# Patient Record
Sex: MALE | Race: BLACK OR AFRICAN AMERICAN | NOT HISPANIC OR LATINO | URBAN - METROPOLITAN AREA
[De-identification: names, ages, dates, MRNs, and addresses within clinical notes are randomized per-mention and may not be internally consistent; named-entity substitution may affect disease eponyms.]

---

## 2019-03-11 ENCOUNTER — EMERGENCY (EMERGENCY)
Facility: HOSPITAL | Age: 49
LOS: 0 days | Discharge: HOME | End: 2019-03-12
Attending: EMERGENCY MEDICINE
Payer: MEDICAID

## 2019-03-11 VITALS
OXYGEN SATURATION: 98 % | HEART RATE: 81 BPM | SYSTOLIC BLOOD PRESSURE: 119 MMHG | RESPIRATION RATE: 16 BRPM | HEIGHT: 69 IN | TEMPERATURE: 98 F | DIASTOLIC BLOOD PRESSURE: 46 MMHG | WEIGHT: 195.99 LBS

## 2019-03-11 DIAGNOSIS — R73.03 PREDIABETES: ICD-10-CM

## 2019-03-11 DIAGNOSIS — R10.9 UNSPECIFIED ABDOMINAL PAIN: ICD-10-CM

## 2019-03-11 DIAGNOSIS — E78.00 PURE HYPERCHOLESTEROLEMIA, UNSPECIFIED: ICD-10-CM

## 2019-03-11 DIAGNOSIS — I10 ESSENTIAL (PRIMARY) HYPERTENSION: ICD-10-CM

## 2019-03-11 DIAGNOSIS — N12 TUBULO-INTERSTITIAL NEPHRITIS, NOT SPECIFIED AS ACUTE OR CHRONIC: ICD-10-CM

## 2019-03-11 PROCEDURE — 99285 EMERGENCY DEPT VISIT HI MDM: CPT

## 2019-03-11 RX ORDER — SODIUM CHLORIDE 9 MG/ML
1000 INJECTION INTRAMUSCULAR; INTRAVENOUS; SUBCUTANEOUS ONCE
Qty: 0 | Refills: 0 | Status: COMPLETED | OUTPATIENT
Start: 2019-03-11 | End: 2019-03-11

## 2019-03-11 RX ORDER — MORPHINE SULFATE 50 MG/1
4 CAPSULE, EXTENDED RELEASE ORAL ONCE
Qty: 0 | Refills: 0 | Status: DISCONTINUED | OUTPATIENT
Start: 2019-03-11 | End: 2019-03-11

## 2019-03-11 RX ORDER — KETOROLAC TROMETHAMINE 30 MG/ML
15 SYRINGE (ML) INJECTION ONCE
Qty: 0 | Refills: 0 | Status: DISCONTINUED | OUTPATIENT
Start: 2019-03-11 | End: 2019-03-11

## 2019-03-11 NOTE — ED ADULT NURSE NOTE - OBJECTIVE STATEMENT
pt received to bed 15 c/o right sided flank pain, dysuria, hematuria, nausea, and chills that have been "off and on" starting 2 days ago. denies: chest, fever, vomiting, burning on urination. pt states he has HTN, high cholesterol and is not compliant with medication regimen.

## 2019-03-11 NOTE — ED ADULT TRIAGE NOTE - CHIEF COMPLAINT QUOTE
pt presents w right flank pain x 1.5 days. Hx of kidney stones, states it feels like that again w hematuria and dysuria. No foul odor, fever, chills. Pt appears comfortable in triage. took tylenol and motrin for pain without relief

## 2019-03-12 VITALS
DIASTOLIC BLOOD PRESSURE: 84 MMHG | RESPIRATION RATE: 18 BRPM | HEART RATE: 63 BPM | SYSTOLIC BLOOD PRESSURE: 127 MMHG | TEMPERATURE: 98 F | OXYGEN SATURATION: 99 %

## 2019-03-12 LAB
ALBUMIN SERPL ELPH-MCNC: 3.3 G/DL — SIGNIFICANT CHANGE UP (ref 3.3–5)
ALP SERPL-CCNC: 70 U/L — SIGNIFICANT CHANGE UP (ref 40–120)
ALT FLD-CCNC: 15 U/L — SIGNIFICANT CHANGE UP (ref 12–78)
ANION GAP SERPL CALC-SCNC: 6 MMOL/L — SIGNIFICANT CHANGE UP (ref 5–17)
APPEARANCE UR: ABNORMAL
AST SERPL-CCNC: 16 U/L — SIGNIFICANT CHANGE UP (ref 15–37)
BACTERIA # UR AUTO: ABNORMAL
BASOPHILS # BLD AUTO: 0.07 K/UL — SIGNIFICANT CHANGE UP (ref 0–0.2)
BASOPHILS NFR BLD AUTO: 0.7 % — SIGNIFICANT CHANGE UP (ref 0–2)
BILIRUB SERPL-MCNC: 0.3 MG/DL — SIGNIFICANT CHANGE UP (ref 0.2–1.2)
BILIRUB UR-MCNC: NEGATIVE — SIGNIFICANT CHANGE UP
BUN SERPL-MCNC: 12 MG/DL — SIGNIFICANT CHANGE UP (ref 7–23)
CALCIUM SERPL-MCNC: 8.4 MG/DL — LOW (ref 8.5–10.1)
CHLORIDE SERPL-SCNC: 109 MMOL/L — HIGH (ref 96–108)
CO2 SERPL-SCNC: 28 MMOL/L — SIGNIFICANT CHANGE UP (ref 22–31)
COLOR SPEC: YELLOW — SIGNIFICANT CHANGE UP
CREAT SERPL-MCNC: 0.93 MG/DL — SIGNIFICANT CHANGE UP (ref 0.5–1.3)
DIFF PNL FLD: ABNORMAL
EOSINOPHIL # BLD AUTO: 0.38 K/UL — SIGNIFICANT CHANGE UP (ref 0–0.5)
EOSINOPHIL NFR BLD AUTO: 4.1 % — SIGNIFICANT CHANGE UP (ref 0–6)
EPI CELLS # UR: SIGNIFICANT CHANGE UP
GLUCOSE SERPL-MCNC: 94 MG/DL — SIGNIFICANT CHANGE UP (ref 70–99)
GLUCOSE UR QL: NEGATIVE MG/DL — SIGNIFICANT CHANGE UP
HCT VFR BLD CALC: 38.5 % — LOW (ref 39–50)
HGB BLD-MCNC: 13 G/DL — SIGNIFICANT CHANGE UP (ref 13–17)
IMM GRANULOCYTES NFR BLD AUTO: 0.2 % — SIGNIFICANT CHANGE UP (ref 0–1.5)
KETONES UR-MCNC: ABNORMAL
LEUKOCYTE ESTERASE UR-ACNC: ABNORMAL
LYMPHOCYTES # BLD AUTO: 3.7 K/UL — HIGH (ref 1–3.3)
LYMPHOCYTES # BLD AUTO: 39.6 % — SIGNIFICANT CHANGE UP (ref 13–44)
MCHC RBC-ENTMCNC: 30.7 PG — SIGNIFICANT CHANGE UP (ref 27–34)
MCHC RBC-ENTMCNC: 33.8 GM/DL — SIGNIFICANT CHANGE UP (ref 32–36)
MCV RBC AUTO: 91 FL — SIGNIFICANT CHANGE UP (ref 80–100)
MONOCYTES # BLD AUTO: 1.17 K/UL — HIGH (ref 0–0.9)
MONOCYTES NFR BLD AUTO: 12.5 % — SIGNIFICANT CHANGE UP (ref 2–14)
NEUTROPHILS # BLD AUTO: 4 K/UL — SIGNIFICANT CHANGE UP (ref 1.8–7.4)
NEUTROPHILS NFR BLD AUTO: 42.9 % — LOW (ref 43–77)
NITRITE UR-MCNC: NEGATIVE — SIGNIFICANT CHANGE UP
NRBC # BLD: 0 /100 WBCS — SIGNIFICANT CHANGE UP (ref 0–0)
PH UR: 6.5 — SIGNIFICANT CHANGE UP (ref 5–8)
PLATELET # BLD AUTO: 240 K/UL — SIGNIFICANT CHANGE UP (ref 150–400)
POTASSIUM SERPL-MCNC: 3.6 MMOL/L — SIGNIFICANT CHANGE UP (ref 3.5–5.3)
POTASSIUM SERPL-SCNC: 3.6 MMOL/L — SIGNIFICANT CHANGE UP (ref 3.5–5.3)
PROT SERPL-MCNC: 7.1 GM/DL — SIGNIFICANT CHANGE UP (ref 6–8.3)
PROT UR-MCNC: 100 MG/DL
RBC # BLD: 4.23 M/UL — SIGNIFICANT CHANGE UP (ref 4.2–5.8)
RBC # FLD: 16.1 % — HIGH (ref 10.3–14.5)
RBC CASTS # UR COMP ASSIST: ABNORMAL /HPF (ref 0–4)
SODIUM SERPL-SCNC: 143 MMOL/L — SIGNIFICANT CHANGE UP (ref 135–145)
SP GR SPEC: 1.01 — SIGNIFICANT CHANGE UP (ref 1.01–1.02)
UROBILINOGEN FLD QL: 1 MG/DL
WBC # BLD: 9.34 K/UL — SIGNIFICANT CHANGE UP (ref 3.8–10.5)
WBC # FLD AUTO: 9.34 K/UL — SIGNIFICANT CHANGE UP (ref 3.8–10.5)
WBC UR QL: SIGNIFICANT CHANGE UP

## 2019-03-12 PROCEDURE — 76700 US EXAM ABDOM COMPLETE: CPT | Mod: 26

## 2019-03-12 PROCEDURE — 74176 CT ABD & PELVIS W/O CONTRAST: CPT | Mod: 26

## 2019-03-12 RX ORDER — CEFTRIAXONE 500 MG/1
1 INJECTION, POWDER, FOR SOLUTION INTRAMUSCULAR; INTRAVENOUS ONCE
Qty: 0 | Refills: 0 | Status: COMPLETED | OUTPATIENT
Start: 2019-03-12 | End: 2019-03-12

## 2019-03-12 RX ORDER — PHENAZOPYRIDINE HCL 100 MG
1 TABLET ORAL
Qty: 5 | Refills: 0 | OUTPATIENT
Start: 2019-03-12 | End: 2019-03-13

## 2019-03-12 RX ORDER — IBUPROFEN 200 MG
1 TABLET ORAL
Qty: 20 | Refills: 0 | OUTPATIENT
Start: 2019-03-12 | End: 2019-03-16

## 2019-03-12 RX ORDER — PHENAZOPYRIDINE HCL 100 MG
200 TABLET ORAL ONCE
Qty: 0 | Refills: 0 | Status: COMPLETED | OUTPATIENT
Start: 2019-03-12 | End: 2019-03-12

## 2019-03-12 RX ORDER — CEPHALEXIN 500 MG
1 CAPSULE ORAL
Qty: 28 | Refills: 0 | OUTPATIENT
Start: 2019-03-12 | End: 2019-03-25

## 2019-03-12 RX ADMIN — Medication 200 MILLIGRAM(S): at 05:10

## 2019-03-12 RX ADMIN — MORPHINE SULFATE 4 MILLIGRAM(S): 50 CAPSULE, EXTENDED RELEASE ORAL at 00:18

## 2019-03-12 RX ADMIN — CEFTRIAXONE 100 GRAM(S): 500 INJECTION, POWDER, FOR SOLUTION INTRAMUSCULAR; INTRAVENOUS at 03:41

## 2019-03-12 RX ADMIN — Medication 15 MILLIGRAM(S): at 00:18

## 2019-03-12 RX ADMIN — CEFTRIAXONE 1 GRAM(S): 500 INJECTION, POWDER, FOR SOLUTION INTRAMUSCULAR; INTRAVENOUS at 05:20

## 2019-03-12 RX ADMIN — Medication 15 MILLIGRAM(S): at 00:47

## 2019-03-12 RX ADMIN — MORPHINE SULFATE 4 MILLIGRAM(S): 50 CAPSULE, EXTENDED RELEASE ORAL at 00:47

## 2019-03-12 RX ADMIN — SODIUM CHLORIDE 1000 MILLILITER(S): 9 INJECTION INTRAMUSCULAR; INTRAVENOUS; SUBCUTANEOUS at 00:18

## 2019-03-12 RX ADMIN — SODIUM CHLORIDE 1000 MILLILITER(S): 9 INJECTION INTRAMUSCULAR; INTRAVENOUS; SUBCUTANEOUS at 01:31

## 2019-03-12 NOTE — ED PROVIDER NOTE - OBJECTIVE STATEMENT
Pertinent PMH/PSH/FHx/SHx and Review of Systems contained within:  Patient presents to the ED for right flank pain radiating to groin.  Pain started about 1 day ago, accompanied by dysuria, hematuria, and mild nausea.  Denies pain in testicles or fever.  Feels similar to when he had a kidney stone, but says its been a long time since he had one.  +Smoker, denies use of other illict drugs or h/o alchol abuse.  Denies psh, has not followed up with urology in a long time.     ROS: No fever/chills, No headache/photophobia/eye pain/changes in vision, No ear pain/sore throat/dysphagia, No chest pain/palpitations, no SOB/cough/wheeze/stridor, No N/V/D/melena, no discharge, No neck/back pain, no rash, no changes in neurological status/function.

## 2019-03-12 NOTE — ED PROVIDER NOTE - CLINICAL SUMMARY MEDICAL DECISION MAKING FREE TEXT BOX
Patient with dysuria and right flank pain, hematuria.  VSS.  Lab values reviewed, there are no values which require acute intervention.  UA is equivocal for infection, CT without visible renal stone.  Discussed with patient that appendix is not visualized, has no RLQ tenderness on palpation but advised to be vigilant.  More likely has pyelonephritis vs radiolucent stone.  Will treat for pyelo, give urology follow up.  Looks good, not septic, patient actually left the ER for a smoke.  Discussed results and outcome of today's visit with the patient.  Patient advised to please follow up with another healthcare provider within the next 24 hours and return to the Emergency Department for worsening symptoms or any other concerns.  Patient advised that their doctor may call  to follow up on the specific results of the tests performed today in the emergency department.   Patient appears well on discharge.

## 2019-03-12 NOTE — ED PROVIDER NOTE - PHYSICAL EXAMINATION
Gen: Alert, moderate distress, well appearing  Head: NC, AT, PERRL, EOMI, normal lids/conjunctiva  ENT: normal hearing, patent oropharynx without erythema/exudate, uvula midline  Neck: +supple, no tenderness/meningismus/JVD, +Trachea midline  Pulm: Bilateral BS, normal resp effort, no wheeze/stridor/retractions  CV: RRR, no M/R/G, +dist pulses  Abd: soft, +right flank and right sided abdominal pain, large abdominal girth, ND, +BS, no palpable masses  Mskel: no edema/erythema/cyanosis  Skin: no rash, warm/dry  Neuro: AAOx3, no apparent sensory/motor deficits, coordination intact

## 2019-03-13 LAB
CULTURE RESULTS: SIGNIFICANT CHANGE UP
SPECIMEN SOURCE: SIGNIFICANT CHANGE UP

## 2019-03-14 ENCOUNTER — EMERGENCY (EMERGENCY)
Facility: HOSPITAL | Age: 49
LOS: 0 days | Discharge: HOME | End: 2019-03-15
Attending: EMERGENCY MEDICINE
Payer: MEDICAID

## 2019-03-14 VITALS
DIASTOLIC BLOOD PRESSURE: 89 MMHG | TEMPERATURE: 98 F | HEIGHT: 69 IN | OXYGEN SATURATION: 99 % | HEART RATE: 75 BPM | RESPIRATION RATE: 16 BRPM | WEIGHT: 195.11 LBS | SYSTOLIC BLOOD PRESSURE: 139 MMHG

## 2019-03-14 DIAGNOSIS — R10.9 UNSPECIFIED ABDOMINAL PAIN: ICD-10-CM

## 2019-03-14 DIAGNOSIS — I10 ESSENTIAL (PRIMARY) HYPERTENSION: ICD-10-CM

## 2019-03-14 DIAGNOSIS — R73.03 PREDIABETES: ICD-10-CM

## 2019-03-14 DIAGNOSIS — N39.0 URINARY TRACT INFECTION, SITE NOT SPECIFIED: ICD-10-CM

## 2019-03-14 DIAGNOSIS — E78.5 HYPERLIPIDEMIA, UNSPECIFIED: ICD-10-CM

## 2019-03-14 LAB
ALBUMIN SERPL ELPH-MCNC: 3.7 G/DL — SIGNIFICANT CHANGE UP (ref 3.3–5)
ALP SERPL-CCNC: 74 U/L — SIGNIFICANT CHANGE UP (ref 40–120)
ALT FLD-CCNC: 17 U/L — SIGNIFICANT CHANGE UP (ref 12–78)
ANION GAP SERPL CALC-SCNC: 9 MMOL/L — SIGNIFICANT CHANGE UP (ref 5–17)
APPEARANCE UR: CLEAR — SIGNIFICANT CHANGE UP
APTT BLD: 32.2 SEC — SIGNIFICANT CHANGE UP (ref 28.5–37)
AST SERPL-CCNC: 18 U/L — SIGNIFICANT CHANGE UP (ref 15–37)
BACTERIA # UR AUTO: NEGATIVE — SIGNIFICANT CHANGE UP
BASOPHILS # BLD AUTO: 0.03 K/UL — SIGNIFICANT CHANGE UP (ref 0–0.2)
BASOPHILS NFR BLD AUTO: 0.3 % — SIGNIFICANT CHANGE UP (ref 0–2)
BILIRUB SERPL-MCNC: 0.6 MG/DL — SIGNIFICANT CHANGE UP (ref 0.2–1.2)
BILIRUB UR-MCNC: ABNORMAL
BUN SERPL-MCNC: 8 MG/DL — SIGNIFICANT CHANGE UP (ref 7–23)
CALCIUM SERPL-MCNC: 8.2 MG/DL — LOW (ref 8.5–10.1)
CHLORIDE SERPL-SCNC: 106 MMOL/L — SIGNIFICANT CHANGE UP (ref 96–108)
CO2 SERPL-SCNC: 23 MMOL/L — SIGNIFICANT CHANGE UP (ref 22–31)
COLOR SPEC: ABNORMAL
CREAT SERPL-MCNC: 1 MG/DL — SIGNIFICANT CHANGE UP (ref 0.5–1.3)
DIFF PNL FLD: NEGATIVE — SIGNIFICANT CHANGE UP
EOSINOPHIL # BLD AUTO: 0.03 K/UL — SIGNIFICANT CHANGE UP (ref 0–0.5)
EOSINOPHIL NFR BLD AUTO: 0.3 % — SIGNIFICANT CHANGE UP (ref 0–6)
EPI CELLS # UR: SIGNIFICANT CHANGE UP
GLUCOSE SERPL-MCNC: 81 MG/DL — SIGNIFICANT CHANGE UP (ref 70–99)
GLUCOSE UR QL: NEGATIVE MG/DL — SIGNIFICANT CHANGE UP
HCT VFR BLD CALC: 39.6 % — SIGNIFICANT CHANGE UP (ref 39–50)
HGB BLD-MCNC: 13.5 G/DL — SIGNIFICANT CHANGE UP (ref 13–17)
IMM GRANULOCYTES NFR BLD AUTO: 0.3 % — SIGNIFICANT CHANGE UP (ref 0–1.5)
INR BLD: 1.03 RATIO — SIGNIFICANT CHANGE UP (ref 0.88–1.16)
KETONES UR-MCNC: ABNORMAL
LACTATE SERPL-SCNC: 1.1 MMOL/L — SIGNIFICANT CHANGE UP (ref 0.7–2)
LEUKOCYTE ESTERASE UR-ACNC: NEGATIVE — SIGNIFICANT CHANGE UP
LIDOCAIN IGE QN: 77 U/L — SIGNIFICANT CHANGE UP (ref 73–393)
LYMPHOCYTES # BLD AUTO: 19.9 % — SIGNIFICANT CHANGE UP (ref 13–44)
LYMPHOCYTES # BLD AUTO: 2.07 K/UL — SIGNIFICANT CHANGE UP (ref 1–3.3)
MCHC RBC-ENTMCNC: 30.7 PG — SIGNIFICANT CHANGE UP (ref 27–34)
MCHC RBC-ENTMCNC: 34.1 GM/DL — SIGNIFICANT CHANGE UP (ref 32–36)
MCV RBC AUTO: 90 FL — SIGNIFICANT CHANGE UP (ref 80–100)
MONOCYTES # BLD AUTO: 0.87 K/UL — SIGNIFICANT CHANGE UP (ref 0–0.9)
MONOCYTES NFR BLD AUTO: 8.4 % — SIGNIFICANT CHANGE UP (ref 2–14)
NEUTROPHILS # BLD AUTO: 7.37 K/UL — SIGNIFICANT CHANGE UP (ref 1.8–7.4)
NEUTROPHILS NFR BLD AUTO: 70.8 % — SIGNIFICANT CHANGE UP (ref 43–77)
NITRITE UR-MCNC: POSITIVE
NRBC # BLD: 0 /100 WBCS — SIGNIFICANT CHANGE UP (ref 0–0)
PH UR: 5 — SIGNIFICANT CHANGE UP (ref 5–8)
PLATELET # BLD AUTO: 250 K/UL — SIGNIFICANT CHANGE UP (ref 150–400)
POTASSIUM SERPL-MCNC: 3.6 MMOL/L — SIGNIFICANT CHANGE UP (ref 3.5–5.3)
POTASSIUM SERPL-SCNC: 3.6 MMOL/L — SIGNIFICANT CHANGE UP (ref 3.5–5.3)
PROT SERPL-MCNC: 7.8 GM/DL — SIGNIFICANT CHANGE UP (ref 6–8.3)
PROT UR-MCNC: NEGATIVE MG/DL — SIGNIFICANT CHANGE UP
PROTHROM AB SERPL-ACNC: 11.6 SEC — SIGNIFICANT CHANGE UP (ref 10–12.9)
RBC # BLD: 4.4 M/UL — SIGNIFICANT CHANGE UP (ref 4.2–5.8)
RBC # FLD: 15.9 % — HIGH (ref 10.3–14.5)
SODIUM SERPL-SCNC: 138 MMOL/L — SIGNIFICANT CHANGE UP (ref 135–145)
SP GR SPEC: 1.01 — SIGNIFICANT CHANGE UP (ref 1.01–1.02)
UROBILINOGEN FLD QL: 4 MG/DL
WBC # BLD: 10.4 K/UL — SIGNIFICANT CHANGE UP (ref 3.8–10.5)
WBC # FLD AUTO: 10.4 K/UL — SIGNIFICANT CHANGE UP (ref 3.8–10.5)

## 2019-03-14 PROCEDURE — 99285 EMERGENCY DEPT VISIT HI MDM: CPT

## 2019-03-14 RX ORDER — IOHEXOL 300 MG/ML
30 INJECTION, SOLUTION INTRAVENOUS ONCE
Qty: 0 | Refills: 0 | Status: COMPLETED | OUTPATIENT
Start: 2019-03-14 | End: 2019-03-14

## 2019-03-14 RX ORDER — ONDANSETRON 8 MG/1
4 TABLET, FILM COATED ORAL ONCE
Qty: 0 | Refills: 0 | Status: COMPLETED | OUTPATIENT
Start: 2019-03-14 | End: 2019-03-14

## 2019-03-14 RX ORDER — MORPHINE SULFATE 50 MG/1
2 CAPSULE, EXTENDED RELEASE ORAL ONCE
Qty: 0 | Refills: 0 | Status: DISCONTINUED | OUTPATIENT
Start: 2019-03-14 | End: 2019-03-14

## 2019-03-14 RX ORDER — LIDOCAINE 4 G/100G
10 CREAM TOPICAL ONCE
Qty: 0 | Refills: 0 | Status: COMPLETED | OUTPATIENT
Start: 2019-03-14 | End: 2019-03-14

## 2019-03-14 RX ORDER — CIPROFLOXACIN LACTATE 400MG/40ML
400 VIAL (ML) INTRAVENOUS ONCE
Qty: 0 | Refills: 0 | Status: COMPLETED | OUTPATIENT
Start: 2019-03-14 | End: 2019-03-14

## 2019-03-14 RX ORDER — SODIUM CHLORIDE 9 MG/ML
2700 INJECTION INTRAMUSCULAR; INTRAVENOUS; SUBCUTANEOUS ONCE
Qty: 0 | Refills: 0 | Status: COMPLETED | OUTPATIENT
Start: 2019-03-14 | End: 2019-03-14

## 2019-03-14 RX ORDER — CEFTRIAXONE 500 MG/1
1 INJECTION, POWDER, FOR SOLUTION INTRAMUSCULAR; INTRAVENOUS ONCE
Qty: 0 | Refills: 0 | Status: DISCONTINUED | OUTPATIENT
Start: 2019-03-14 | End: 2019-03-14

## 2019-03-14 RX ADMIN — IOHEXOL 30 MILLILITER(S): 300 INJECTION, SOLUTION INTRAVENOUS at 21:20

## 2019-03-14 RX ADMIN — SODIUM CHLORIDE 2700 MILLILITER(S): 9 INJECTION INTRAMUSCULAR; INTRAVENOUS; SUBCUTANEOUS at 21:19

## 2019-03-14 RX ADMIN — ONDANSETRON 4 MILLIGRAM(S): 8 TABLET, FILM COATED ORAL at 21:45

## 2019-03-14 RX ADMIN — MORPHINE SULFATE 2 MILLIGRAM(S): 50 CAPSULE, EXTENDED RELEASE ORAL at 23:50

## 2019-03-14 RX ADMIN — Medication 30 MILLILITER(S): at 21:44

## 2019-03-14 RX ADMIN — Medication 200 MILLIGRAM(S): at 23:49

## 2019-03-14 RX ADMIN — MORPHINE SULFATE 2 MILLIGRAM(S): 50 CAPSULE, EXTENDED RELEASE ORAL at 21:18

## 2019-03-14 RX ADMIN — LIDOCAINE 10 MILLILITER(S): 4 CREAM TOPICAL at 21:44

## 2019-03-14 NOTE — ED ADULT TRIAGE NOTE - CHIEF COMPLAINT QUOTE
patient states I was here two nights ago and it was painful to urinate, now I have pain in my abdomen and lower back and nauseous, and I still have pain when I urinate. took two percocet one hour ago

## 2019-03-14 NOTE — ED ADULT NURSE NOTE - NSIMPLEMENTINTERV_GEN_ALL_ED
Implemented All Universal Safety Interventions:  [place] to call system. Call bell, personal items and telephone within reach. Instruct patient to call for assistance. Room bathroom lighting operational. Non-slip footwear when patient is off stretcher. Physically safe environment: no spills, clutter or unnecessary equipment. Stretcher in lowest position, wheels locked, appropriate side rails in place.

## 2019-03-15 VITALS
HEART RATE: 105 BPM | DIASTOLIC BLOOD PRESSURE: 79 MMHG | OXYGEN SATURATION: 99 % | SYSTOLIC BLOOD PRESSURE: 127 MMHG | TEMPERATURE: 98 F | RESPIRATION RATE: 18 BRPM

## 2019-03-15 PROBLEM — R73.03 PREDIABETES: Chronic | Status: ACTIVE | Noted: 2019-03-11

## 2019-03-15 PROBLEM — I10 ESSENTIAL (PRIMARY) HYPERTENSION: Chronic | Status: ACTIVE | Noted: 2019-03-11

## 2019-03-15 PROBLEM — E78.00 PURE HYPERCHOLESTEROLEMIA, UNSPECIFIED: Chronic | Status: ACTIVE | Noted: 2019-03-11

## 2019-03-15 LAB
CULTURE RESULTS: NO GROWTH — SIGNIFICANT CHANGE UP
SPECIMEN SOURCE: SIGNIFICANT CHANGE UP

## 2019-03-15 PROCEDURE — 74177 CT ABD & PELVIS W/CONTRAST: CPT | Mod: 26

## 2019-03-15 RX ORDER — KETOROLAC TROMETHAMINE 30 MG/ML
15 SYRINGE (ML) INJECTION ONCE
Qty: 0 | Refills: 0 | Status: DISCONTINUED | OUTPATIENT
Start: 2019-03-15 | End: 2019-03-15

## 2019-03-15 RX ADMIN — Medication 15 MILLIGRAM(S): at 02:07

## 2019-03-15 NOTE — ED PROVIDER NOTE - OBJECTIVE STATEMENT
Pertinent PMH/PSH/FHx/SHx and Review of Systems contained within:  50 yo m with htn and hld presents in ED c/o suprapubic pain. Patient was seen in ED 2 days ago for same symptoms, was dx with pyelonephritis and rx abx. No aggravating or relieving factors, No fever/chills, No photophobia/eye pain/changes in vision, No ear pain/sore throat/dysphagia, No chest pain/palpitations, no SOB/cough/wheeze/stridor, No N/V/D, no dysuria/frequency/discharge, No neck/back pain, no rash, no changes in neurological status/function.

## 2019-03-15 NOTE — ED PROVIDER NOTE - CLINICAL SUMMARY MEDICAL DECISION MAKING FREE TEXT BOX
labs, ct, pain mgmt, gi cocktail, iv abx, re-eval  patient in good condition asking to go outside to smoke cigarette. d/c with care instructions. f/up with pmd. Discussed results and outcome of testing with the patient.  Patient advised to please follow up with their primary care doctor within the next 24 hours and return to the Emergency Department for worsening symptoms or any other concerns.  Patient advised that their doctor may call  to follow up on the specific results of the tests performed today in the emergency department.

## 2019-03-20 LAB
CULTURE RESULTS: SIGNIFICANT CHANGE UP
CULTURE RESULTS: SIGNIFICANT CHANGE UP
SPECIMEN SOURCE: SIGNIFICANT CHANGE UP
SPECIMEN SOURCE: SIGNIFICANT CHANGE UP

## 2019-03-24 ENCOUNTER — EMERGENCY (EMERGENCY)
Facility: HOSPITAL | Age: 49
LOS: 0 days | Discharge: HOME | End: 2019-03-25
Attending: STUDENT IN AN ORGANIZED HEALTH CARE EDUCATION/TRAINING PROGRAM
Payer: MEDICAID

## 2019-03-24 VITALS
TEMPERATURE: 98 F | DIASTOLIC BLOOD PRESSURE: 72 MMHG | RESPIRATION RATE: 16 BRPM | HEIGHT: 69 IN | SYSTOLIC BLOOD PRESSURE: 120 MMHG | HEART RATE: 65 BPM | WEIGHT: 195.99 LBS | OXYGEN SATURATION: 98 %

## 2019-03-24 DIAGNOSIS — Z79.1 LONG TERM (CURRENT) USE OF NON-STEROIDAL ANTI-INFLAMMATORIES (NSAID): ICD-10-CM

## 2019-03-24 DIAGNOSIS — I10 ESSENTIAL (PRIMARY) HYPERTENSION: ICD-10-CM

## 2019-03-24 DIAGNOSIS — R10.9 UNSPECIFIED ABDOMINAL PAIN: ICD-10-CM

## 2019-03-24 DIAGNOSIS — E78.00 PURE HYPERCHOLESTEROLEMIA, UNSPECIFIED: ICD-10-CM

## 2019-03-24 DIAGNOSIS — M54.9 DORSALGIA, UNSPECIFIED: ICD-10-CM

## 2019-03-24 DIAGNOSIS — R73.03 PREDIABETES: ICD-10-CM

## 2019-03-24 DIAGNOSIS — R11.2 NAUSEA WITH VOMITING, UNSPECIFIED: ICD-10-CM

## 2019-03-24 LAB
APPEARANCE UR: CLEAR — SIGNIFICANT CHANGE UP
BILIRUB UR-MCNC: NEGATIVE — SIGNIFICANT CHANGE UP
COLOR SPEC: YELLOW — SIGNIFICANT CHANGE UP
DIFF PNL FLD: NEGATIVE — SIGNIFICANT CHANGE UP
GLUCOSE UR QL: NEGATIVE MG/DL — SIGNIFICANT CHANGE UP
KETONES UR-MCNC: NEGATIVE — SIGNIFICANT CHANGE UP
LEUKOCYTE ESTERASE UR-ACNC: NEGATIVE — SIGNIFICANT CHANGE UP
NITRITE UR-MCNC: NEGATIVE — SIGNIFICANT CHANGE UP
PH UR: 6 — SIGNIFICANT CHANGE UP (ref 5–8)
PROT UR-MCNC: NEGATIVE MG/DL — SIGNIFICANT CHANGE UP
SP GR SPEC: 1.02 — SIGNIFICANT CHANGE UP (ref 1.01–1.02)
UROBILINOGEN FLD QL: 1 MG/DL

## 2019-03-24 PROCEDURE — 99283 EMERGENCY DEPT VISIT LOW MDM: CPT

## 2019-03-24 RX ORDER — TRAMADOL HYDROCHLORIDE 50 MG/1
50 TABLET ORAL ONCE
Qty: 0 | Refills: 0 | Status: DISCONTINUED | OUTPATIENT
Start: 2019-03-24 | End: 2019-03-24

## 2019-03-24 NOTE — ED PROVIDER NOTE - CARE PROVIDER_API CALL
Orville Venegas)  Urology  [address] [place] [address]  Phone: [phone]  Fax: [phone]  Follow Up Time:

## 2019-03-24 NOTE — ED ADULT NURSE NOTE - NSIMPLEMENTINTERV_GEN_ALL_ED
Implemented All Universal Safety Interventions:  [place] to call system. Call bell, personal items and telephone within reach. Instruct patient to call for assistance. Room bathroom lighting operational. Non-slip footwear when patient is off stretcher. Physically safe environment: no spills, clutter or unnecessary equipment. Stretcher in lowest position, wheels locked, appropriate side rails in place.

## 2019-03-24 NOTE — ED ADULT TRIAGE NOTE - CHIEF COMPLAINT QUOTE
patient states im nauseous, I have back pain radiating to the abdomen, last urinated this morning. denies hematuria

## 2019-03-24 NOTE — ED PROVIDER NOTE - CLINICAL SUMMARY MEDICAL DECISION MAKING FREE TEXT BOX
pt presented with abdominal pain, pt has been seen for the same symptoms, had ct and sonogram which were negative, pt was treated for a uti, urine now clean, pt refuses to have a repeat ct due to too much radiation exposure, intitally agreed for lab work but then also refused labs, asked what would be given for pain, tramadol given, pt told he has to follow up with urology, outpt referral given for a urology clinic pt presented with abdominal pain, pt has been seen for the same symptoms, had ct and sonogram which were negative, pt was treated for a uti, urine now clean, pt refuses to have a repeat ct due to too much radiation exposure, inittally agreed for lab work but then also refused labs, asked what would be given for pain, tramadol given, pt told he has to follow up with urology, outpt referral given for a urology clinic

## 2019-03-24 NOTE — ED ADULT NURSE NOTE - OBJECTIVE STATEMENT
Pt is c/o persistent pain LLQ and Left flank, painful urination, and nausea.  Pt is currently taking abx treatment.

## 2019-03-24 NOTE — ED PROVIDER NOTE - OBJECTIVE STATEMENT
49 year old male presents today c/o back pain and abdominal pain associated with nausea and vomiting, pt has been seen two other times in the ER for the same symptoms, has had two Ct and sono which showed mild cystitis, pt was treated for pyelo and given percocet for pain, urine cultures showed <10,000 normal mau, blood cultures were negative, pt did not follow up with urology, (-) fevers or chills (-) chest pain (-) sob 49 year old male presents today c/o right back pain and abdominal pain associated with nausea and vomiting, pt has been seen two other times in the ER for the same symptoms, has had two Ct and sono which showed mild cystitis, pt was treated for pyelo and given percocet for pain, urine cultures showed <10,000 normal mau, blood cultures were negative, pt did not follow up with urology, (-) fevers or chills (-) chest pain (-) sob

## 2019-03-25 RX ORDER — TRAMADOL HYDROCHLORIDE 50 MG/1
1 TABLET ORAL
Qty: 12 | Refills: 0 | OUTPATIENT
Start: 2019-03-25 | End: 2019-03-27

## 2019-03-25 RX ADMIN — TRAMADOL HYDROCHLORIDE 50 MILLIGRAM(S): 50 TABLET ORAL at 00:05

## 2019-03-26 LAB
CULTURE RESULTS: NO GROWTH — SIGNIFICANT CHANGE UP
SPECIMEN SOURCE: SIGNIFICANT CHANGE UP

## 2019-03-28 PROBLEM — Z00.00 ENCOUNTER FOR PREVENTIVE HEALTH EXAMINATION: Status: ACTIVE | Noted: 2019-03-28

## 2019-03-29 ENCOUNTER — OUTPATIENT (OUTPATIENT)
Dept: OUTPATIENT SERVICES | Facility: HOSPITAL | Age: 49
LOS: 1 days | End: 2019-03-29
Payer: MEDICAID

## 2019-03-29 ENCOUNTER — RESULT REVIEW (OUTPATIENT)
Age: 49
End: 2019-03-29

## 2019-03-29 ENCOUNTER — APPOINTMENT (OUTPATIENT)
Dept: UROLOGY | Facility: CLINIC | Age: 49
End: 2019-03-29

## 2019-03-29 VITALS
BODY MASS INDEX: 28.88 KG/M2 | HEIGHT: 69 IN | WEIGHT: 195 LBS | SYSTOLIC BLOOD PRESSURE: 146 MMHG | HEART RATE: 71 BPM | RESPIRATION RATE: 16 BRPM | DIASTOLIC BLOOD PRESSURE: 86 MMHG

## 2019-03-29 DIAGNOSIS — R35.0 FREQUENCY OF MICTURITION: ICD-10-CM

## 2019-03-29 DIAGNOSIS — R31.29 OTHER MICROSCOPIC HEMATURIA: ICD-10-CM

## 2019-03-29 DIAGNOSIS — Z86.39 PERSONAL HISTORY OF OTHER ENDOCRINE, NUTRITIONAL AND METABOLIC DISEASE: ICD-10-CM

## 2019-03-29 DIAGNOSIS — R10.9 UNSPECIFIED ABDOMINAL PAIN: ICD-10-CM

## 2019-03-29 DIAGNOSIS — F17.200 NICOTINE DEPENDENCE, UNSPECIFIED, UNCOMPLICATED: ICD-10-CM

## 2019-03-29 PROCEDURE — G0463: CPT | Mod: 25

## 2019-03-29 PROCEDURE — 88112 CYTOPATH CELL ENHANCE TECH: CPT | Mod: 26

## 2019-03-29 PROCEDURE — 88112 CYTOPATH CELL ENHANCE TECH: CPT

## 2019-03-29 PROCEDURE — 51798 US URINE CAPACITY MEASURE: CPT

## 2019-04-01 NOTE — HISTORY OF PRESENT ILLNESS
[Urinary Frequency] : urinary frequency [Nocturia] : nocturia [Intermittency] : intermittency [Dysuria] : dysuria [Abdominal Pain] : abdominal pain [Nausea] : nausea [FreeTextEntry1] : Mr Persaud is a 50 yo male with hx heart disease and DM presents with abdominal pain and LUTS. The pain has been for the last 2 weeks. It is located in the epigastrium/left midabdomen and is worsened by urination and movement. It is associated with new-onset urinary frequency, nocturia and intermittency. When it started, he noticed blood in his urine which prompted him to go to the ED. Urine culture at that time was negative and CT abdomen showed a thickened bladder wall. No stones or other intraabdominal pathology was noted. His denies any further gross hematuria. One week later the pain continued and he went to the ED. He still did not have a urinary tract infection and CT findings were the same. \par \par Since 2 days ago, he started having nonbloody diarrhea and nausea. He has occasional chills but no fevers.  [Urinary Incontinence] : no urinary incontinence [Urinary Retention] : no urinary retention [Urinary Urgency] : no urinary urgency [Straining] : no straining [Hematuria - Gross] : no gross hematuria [Hematuria - Microscopic] : no microscopic hematuria [Fever] : no fever

## 2019-04-01 NOTE — REVIEW OF SYSTEMS
[Chills] : chills [Feeling Tired] : feeling tired [Eyesight Problems] : eyesight problems [Earache] : earache [Sore Throat] : sore throat [Chest Pain] : chest pain [Shortness Of Breath] : shortness of breath [Cough] : cough [Wheezing] : wheezing [Abdominal Pain] : abdominal pain [Vomiting] : vomiting [Constipation] : constipation [Diarrhea] : diarrhea [Heartburn] : heartburn [Dysuria] : dysuria [Nocturia] : nocturia [Joint Pain] : joint pain [Skin Wound] : skin wound [Itching] : itching [Dizziness] : dizziness [Limb Weakness] : limb weakness [Anxiety] : anxiety [Muscle Weakness] : muscle weakness [Feelings Of Weakness] : feelings of weakness [Fever] : no fever [Dry Eyes] : no dryness of the eyes [Palpitations] : no palpitations [Joint Swelling] : no joint swelling [Limb Swelling] : no limb swelling [Skin Lesions] : no skin lesions [Difficulty Walking] : no difficulty walking [Depression] : no depression [Hot Flashes] : no hot flashes

## 2019-04-01 NOTE — ASSESSMENT
[FreeTextEntry1] : 49M smoker (40 pack years) with hx DM and HLD presenting with abdominal pain and LUTS x 2 weeks and 1 day of gross hematuria. Repeat UA negative for blood. Urine culture negative x2. CT scan shows bladder wall thickening and a right inguinal hernia but no stone disease or other intraabdominal pathology\par \par - Due to the urinary symptoms and hematuria, patient will need a workup to evaluate for causes of hematuria. This is especially important considering his significant smoking history and findings of bladder wall thickening on CT. \par - Urine cytology sent\par - Followup for cystoscopy in office\par - The location of the patient's pain as well as diarrhea makes the etiology of the pain unlikely to be urologic. Patient was advised to followup with a primary care physician for further evaluation\par

## 2019-04-01 NOTE — PHYSICAL EXAM
[General Appearance - Well Developed] : well developed [General Appearance - Well Nourished] : well nourished [Normal Appearance] : normal appearance [] : no respiratory distress [Exaggerated Use Of Accessory Muscles For Inspiration] : no accessory muscle use [Chest Palpation] : palpation of the chest revealed no abnormalities [Abdomen Soft] : soft [Urethral Meatus] : meatus normal [Penis Abnormality] : normal uncircumcised penis [Scrotum] : the scrotum was normal [Epididymis] : the epididymides were normal [Testes Tenderness] : no tenderness of the testes [Normal Station and Gait] : the gait and station were normal for the patient's age [Oriented To Time, Place, And Person] : oriented to person, place, and time [Cervical Lymph Nodes Enlarged Posterior Bilaterally] : posterior cervical [Supraclavicular Lymph Nodes Enlarged Bilaterally] : supraclavicular [Inguinal Lymph Nodes Enlarged Bilaterally] : inguinal [FreeTextEntry1] : PVR = 0

## 2019-04-05 DIAGNOSIS — R93.89 ABNORMAL FINDINGS ON DIAGNOSTIC IMAGING OF OTHER SPECIFIED BODY STRUCTURES: ICD-10-CM

## 2019-04-05 DIAGNOSIS — R31.0 GROSS HEMATURIA: ICD-10-CM

## 2019-04-05 DIAGNOSIS — R31.29 OTHER MICROSCOPIC HEMATURIA: ICD-10-CM

## 2019-04-26 ENCOUNTER — APPOINTMENT (OUTPATIENT)
Dept: UROLOGY | Facility: CLINIC | Age: 49
End: 2019-04-26

## 2019-04-26 ENCOUNTER — OUTPATIENT (OUTPATIENT)
Dept: OUTPATIENT SERVICES | Facility: HOSPITAL | Age: 49
LOS: 1 days | End: 2019-04-26
Payer: MEDICAID

## 2019-04-26 DIAGNOSIS — R93.89 ABNORMAL FINDINGS ON DIAGNOSTIC IMAGING OF OTHER SPECIFIED BODY STRUCTURES: ICD-10-CM

## 2019-04-26 DIAGNOSIS — R35.0 FREQUENCY OF MICTURITION: ICD-10-CM

## 2019-04-26 PROCEDURE — 52000 CYSTOURETHROSCOPY: CPT

## 2019-04-26 PROCEDURE — G0463: CPT | Mod: 25

## 2019-05-09 DIAGNOSIS — R31.0 GROSS HEMATURIA: ICD-10-CM

## 2019-05-09 DIAGNOSIS — R93.89 ABNORMAL FINDINGS ON DIAGNOSTIC IMAGING OF OTHER SPECIFIED BODY STRUCTURES: ICD-10-CM

## 2019-05-09 DIAGNOSIS — N32.89 OTHER SPECIFIED DISORDERS OF BLADDER: ICD-10-CM

## 2019-08-22 ENCOUNTER — APPOINTMENT (OUTPATIENT)
Dept: GASTROENTEROLOGY | Facility: CLINIC | Age: 49
End: 2019-08-22
Payer: MEDICAID

## 2019-08-22 VITALS
RESPIRATION RATE: 16 BRPM | OXYGEN SATURATION: 96 % | DIASTOLIC BLOOD PRESSURE: 68 MMHG | HEART RATE: 67 BPM | BODY MASS INDEX: 23.25 KG/M2 | WEIGHT: 157 LBS | SYSTOLIC BLOOD PRESSURE: 110 MMHG | HEIGHT: 69 IN

## 2019-08-22 DIAGNOSIS — Z78.9 OTHER SPECIFIED HEALTH STATUS: ICD-10-CM

## 2019-08-22 DIAGNOSIS — R63.4 ABNORMAL WEIGHT LOSS: ICD-10-CM

## 2019-08-22 DIAGNOSIS — R10.13 EPIGASTRIC PAIN: ICD-10-CM

## 2019-08-22 DIAGNOSIS — R11.2 NAUSEA WITH VOMITING, UNSPECIFIED: ICD-10-CM

## 2019-08-22 DIAGNOSIS — Z83.3 FAMILY HISTORY OF DIABETES MELLITUS: ICD-10-CM

## 2019-08-22 DIAGNOSIS — R68.81 EARLY SATIETY: ICD-10-CM

## 2019-08-22 PROCEDURE — 36415 COLL VENOUS BLD VENIPUNCTURE: CPT

## 2019-08-22 PROCEDURE — 99204 OFFICE O/P NEW MOD 45 MIN: CPT | Mod: 25

## 2019-08-22 RX ORDER — KETOROLAC TROMETHAMINE 10 MG/1
10 TABLET, FILM COATED ORAL EVERY 6 HOURS
Qty: 12 | Refills: 0 | Status: DISCONTINUED | COMMUNITY
Start: 2019-03-29 | End: 2019-08-22

## 2019-08-23 LAB
ALBUMIN SERPL ELPH-MCNC: 4.3 G/DL
ALP BLD-CCNC: 66 U/L
ALT SERPL-CCNC: 6 U/L
AMYLASE/CREAT SERPL: 63 U/L
ANION GAP SERPL CALC-SCNC: 13 MMOL/L
AST SERPL-CCNC: 13 U/L
BASOPHILS # BLD AUTO: 0.06 K/UL
BASOPHILS NFR BLD AUTO: 0.8 %
BILIRUB SERPL-MCNC: 0.6 MG/DL
BUN SERPL-MCNC: 11 MG/DL
CALCIUM SERPL-MCNC: 9.5 MG/DL
CHLORIDE SERPL-SCNC: 105 MMOL/L
CO2 SERPL-SCNC: 25 MMOL/L
CREAT SERPL-MCNC: 1.02 MG/DL
EOSINOPHIL # BLD AUTO: 0.18 K/UL
EOSINOPHIL NFR BLD AUTO: 2.3 %
GLUCOSE SERPL-MCNC: 124 MG/DL
HCT VFR BLD CALC: 45.4 %
HGB BLD-MCNC: 15.2 G/DL
IMM GRANULOCYTES NFR BLD AUTO: 0.3 %
LYMPHOCYTES # BLD AUTO: 2.68 K/UL
LYMPHOCYTES NFR BLD AUTO: 34 %
MAN DIFF?: NORMAL
MCHC RBC-ENTMCNC: 32.3 PG
MCHC RBC-ENTMCNC: 33.5 GM/DL
MCV RBC AUTO: 96.4 FL
MONOCYTES # BLD AUTO: 0.81 K/UL
MONOCYTES NFR BLD AUTO: 10.3 %
NEUTROPHILS # BLD AUTO: 4.14 K/UL
NEUTROPHILS NFR BLD AUTO: 52.3 %
PLATELET # BLD AUTO: 234 K/UL
POTASSIUM SERPL-SCNC: 4.1 MMOL/L
PROT SERPL-MCNC: 7.1 G/DL
RBC # BLD: 4.71 M/UL
RBC # FLD: 16.8 %
SODIUM SERPL-SCNC: 143 MMOL/L
TSH SERPL-ACNC: 0.56 UIU/ML
WBC # FLD AUTO: 7.89 K/UL

## 2019-09-12 NOTE — ASSESSMENT
[FreeTextEntry1] : Assessment\par 1) early satiety, nausea, vomiting associated with weight loss in a cigarette smoker is all suggestive of a gastric cancer, rule out gastric ulcer, gastroparesis diabeticorum, lymphoma;  pancreatic cancer would be less likely in view of negative CAT scan in the past year; occult hyperthyroidism is also possible\par 2) average risk for colon cancer with negative colonoscopy in 2011, next screening colonoscopy would be in 2021, \par Plan\par 1) Upper Endoscopy risks, benefits and preparation discussed with the patient \par 2) CBC, CMP, amylase, TSH\par 3) office follow-up

## 2019-09-12 NOTE — PHYSICAL EXAM
[General Appearance - Alert] : alert [General Appearance - In No Acute Distress] : in no acute distress [Sclera] : the sclera and conjunctiva were normal [Examination Of The Oral Cavity] : the lips and gums were normal [PERRL With Normal Accommodation] : pupils were equal in size, round, and reactive to light [Oropharynx] : the oropharynx was normal [Neck Appearance] : the appearance of the neck was normal [Neck Cervical Mass (___cm)] : no neck mass was observed [Jugular Venous Distention Increased] : there was no jugular-venous distention [Thyroid Nodule] : there were no palpable thyroid nodules [Thyroid Diffuse Enlargement] : the thyroid was not enlarged [Auscultation Breath Sounds / Voice Sounds] : lungs were clear to auscultation bilaterally [Heart Rate And Rhythm] : heart rate was normal and rhythm regular [Heart Sounds] : normal S1 and S2 [Heart Sounds Gallop] : no gallops [Murmurs] : no murmurs [Heart Sounds Pericardial Friction Rub] : no pericardial rub [Arterial Pulses Carotid] : carotid pulses were normal with no bruits [Full Pulse] : the pedal pulses are present [Abdominal Aorta] : the abdominal aorta was normal [Edema] : there was no peripheral edema [Bowel Sounds] : normal bowel sounds [Abdomen Soft] : soft [Abdomen Mass (___ Cm)] : no abdominal mass palpated [Abdomen Hernia] : no hernia was discovered [Epigastric] : in the epigastric area [Normal Sphincter Tone] : normal sphincter tone [Internal Hemorrhoid] : internal hemorrhoids [No Rectal Mass] : no rectal mass [Prostate Enlargement] : the prostate was not enlarged [Cervical Lymph Nodes Enlarged Posterior Bilaterally] : posterior cervical [Cervical Lymph Nodes Enlarged Anterior Bilaterally] : anterior cervical [Supraclavicular Lymph Nodes Enlarged Bilaterally] : supraclavicular [Axillary Lymph Nodes Enlarged Bilaterally] : axillary [Femoral Lymph Nodes Enlarged Bilaterally] : femoral [Inguinal Lymph Nodes Enlarged Bilaterally] : inguinal [No CVA Tenderness] : no ~M costovertebral angle tenderness [No Spinal Tenderness] : no spinal tenderness [Abnormal Walk] : normal gait [Nail Clubbing] : no clubbing  or cyanosis of the fingernails [Motor Tone] : muscle strength and tone were normal [Musculoskeletal - Swelling] : no joint swelling seen [Skin Color & Pigmentation] : normal skin color and pigmentation [Skin Turgor] : normal skin turgor [] : no rash [Deep Tendon Reflexes (DTR)] : deep tendon reflexes were 2+ and symmetric [Sensation] : the sensory exam was normal to light touch and pinprick [No Focal Deficits] : no focal deficits [Oriented To Time, Place, And Person] : oriented to person, place, and time [Impaired Insight] : insight and judgment were intact [Affect] : the affect was normal [FreeTextEntry1] : Mallampati 1 [External Hemorrhoid] : no external hemorrhoids [Occult Blood Positive] : stool was negative for occult blood

## 2019-09-12 NOTE — HISTORY OF PRESENT ILLNESS
[_________] : Performed [unfilled] [de-identified] :  Mr. CHRISTOPHER KNOX 49 year man, is seen for evaluation of dyspepsia manifest by  epigastric pain, nocturnal pain, heartburn, nausea, vomiting and bloating.  The pain is not related to meals  He eats one meal per day.  He has noted 40 lbs. of weight loss over the past 8 months.  His appetite is reduced and he notes early satiety.   The patient denies   nocturnal cough, , a change in bowel habit, dysphagia, weight loss, jaundice, melena or hematochezia.  He has  bowel movements daily.  He had a colonoscopy in 2013 for evaluation of hematochezia and hemorrhoids were found.\par \par Urological work-up for hematuria was negative in April 2019.\par There is no family history of colon cancer, colon polyp, peptic ulcer disease, female genitourinary disease, liver disease, cholelithiasis, pancreatic disease or cancer, inflammatory bowel disease or celiac disease.\par   [de-identified] : hemorrhoids

## 2019-09-29 ENCOUNTER — EMERGENCY (EMERGENCY)
Facility: HOSPITAL | Age: 49
LOS: 1 days | Discharge: HOME | End: 2019-09-29
Attending: EMERGENCY MEDICINE | Admitting: EMERGENCY MEDICINE
Payer: MEDICAID

## 2019-09-29 VITALS — WEIGHT: 158.73 LBS

## 2019-09-29 VITALS
SYSTOLIC BLOOD PRESSURE: 121 MMHG | OXYGEN SATURATION: 100 % | TEMPERATURE: 98 F | RESPIRATION RATE: 16 BRPM | HEART RATE: 64 BPM | DIASTOLIC BLOOD PRESSURE: 87 MMHG

## 2019-09-29 LAB
ALBUMIN SERPL ELPH-MCNC: 4.1 G/DL — SIGNIFICANT CHANGE UP (ref 3.3–5)
ALP SERPL-CCNC: 62 U/L — SIGNIFICANT CHANGE UP (ref 40–120)
ALT FLD-CCNC: 6 U/L — SIGNIFICANT CHANGE UP (ref 4–41)
ANION GAP SERPL CALC-SCNC: 13 MMO/L — SIGNIFICANT CHANGE UP (ref 7–14)
APTT BLD: 31.9 SEC — SIGNIFICANT CHANGE UP (ref 27.5–36.3)
AST SERPL-CCNC: 18 U/L — SIGNIFICANT CHANGE UP (ref 4–40)
BASOPHILS # BLD AUTO: 0.05 K/UL — SIGNIFICANT CHANGE UP (ref 0–0.2)
BASOPHILS NFR BLD AUTO: 0.5 % — SIGNIFICANT CHANGE UP (ref 0–2)
BILIRUB SERPL-MCNC: 0.5 MG/DL — SIGNIFICANT CHANGE UP (ref 0.2–1.2)
BUN SERPL-MCNC: 12 MG/DL — SIGNIFICANT CHANGE UP (ref 7–23)
CALCIUM SERPL-MCNC: 9.3 MG/DL — SIGNIFICANT CHANGE UP (ref 8.4–10.5)
CHLORIDE SERPL-SCNC: 102 MMOL/L — SIGNIFICANT CHANGE UP (ref 98–107)
CO2 SERPL-SCNC: 26 MMOL/L — SIGNIFICANT CHANGE UP (ref 22–31)
CREAT SERPL-MCNC: 0.86 MG/DL — SIGNIFICANT CHANGE UP (ref 0.5–1.3)
EOSINOPHIL # BLD AUTO: 0.14 K/UL — SIGNIFICANT CHANGE UP (ref 0–0.5)
EOSINOPHIL NFR BLD AUTO: 1.4 % — SIGNIFICANT CHANGE UP (ref 0–6)
GLUCOSE SERPL-MCNC: 126 MG/DL — HIGH (ref 70–99)
HCT VFR BLD CALC: 40.5 % — SIGNIFICANT CHANGE UP (ref 39–50)
HGB BLD-MCNC: 14.2 G/DL — SIGNIFICANT CHANGE UP (ref 13–17)
IMM GRANULOCYTES NFR BLD AUTO: 0.3 % — SIGNIFICANT CHANGE UP (ref 0–1.5)
INR BLD: 0.87 — LOW (ref 0.88–1.17)
LYMPHOCYTES # BLD AUTO: 3.58 K/UL — HIGH (ref 1–3.3)
LYMPHOCYTES # BLD AUTO: 37 % — SIGNIFICANT CHANGE UP (ref 13–44)
MCHC RBC-ENTMCNC: 32.6 PG — SIGNIFICANT CHANGE UP (ref 27–34)
MCHC RBC-ENTMCNC: 35.1 % — SIGNIFICANT CHANGE UP (ref 32–36)
MCV RBC AUTO: 92.9 FL — SIGNIFICANT CHANGE UP (ref 80–100)
MONOCYTES # BLD AUTO: 1.26 K/UL — HIGH (ref 0–0.9)
MONOCYTES NFR BLD AUTO: 13 % — SIGNIFICANT CHANGE UP (ref 2–14)
NEUTROPHILS # BLD AUTO: 4.61 K/UL — SIGNIFICANT CHANGE UP (ref 1.8–7.4)
NEUTROPHILS NFR BLD AUTO: 47.8 % — SIGNIFICANT CHANGE UP (ref 43–77)
NRBC # FLD: 0 K/UL — SIGNIFICANT CHANGE UP (ref 0–0)
PLATELET # BLD AUTO: 234 K/UL — SIGNIFICANT CHANGE UP (ref 150–400)
PMV BLD: 9.2 FL — SIGNIFICANT CHANGE UP (ref 7–13)
POTASSIUM SERPL-MCNC: 4.2 MMOL/L — SIGNIFICANT CHANGE UP (ref 3.5–5.3)
POTASSIUM SERPL-SCNC: 4.2 MMOL/L — SIGNIFICANT CHANGE UP (ref 3.5–5.3)
PROT SERPL-MCNC: 7.1 G/DL — SIGNIFICANT CHANGE UP (ref 6–8.3)
PROTHROM AB SERPL-ACNC: 9.9 SEC — SIGNIFICANT CHANGE UP (ref 9.8–13.1)
RBC # BLD: 4.36 M/UL — SIGNIFICANT CHANGE UP (ref 4.2–5.8)
RBC # FLD: 14.2 % — SIGNIFICANT CHANGE UP (ref 10.3–14.5)
SODIUM SERPL-SCNC: 141 MMOL/L — SIGNIFICANT CHANGE UP (ref 135–145)
WBC # BLD: 9.67 K/UL — SIGNIFICANT CHANGE UP (ref 3.8–10.5)
WBC # FLD AUTO: 9.67 K/UL — SIGNIFICANT CHANGE UP (ref 3.8–10.5)

## 2019-09-29 PROCEDURE — 99284 EMERGENCY DEPT VISIT MOD MDM: CPT | Mod: 25

## 2019-09-29 PROCEDURE — 70450 CT HEAD/BRAIN W/O DYE: CPT | Mod: 26

## 2019-09-29 PROCEDURE — 93010 ELECTROCARDIOGRAM REPORT: CPT

## 2019-09-29 RX ORDER — KETOROLAC TROMETHAMINE 30 MG/ML
30 SYRINGE (ML) INJECTION ONCE
Refills: 0 | Status: DISCONTINUED | OUTPATIENT
Start: 2019-09-29 | End: 2019-09-29

## 2019-09-29 RX ADMIN — Medication 30 MILLIGRAM(S): at 23:55

## 2019-09-29 NOTE — ED PROVIDER NOTE - OBJECTIVE STATEMENT
Kolby TOLBERT: Patient is a 48 yo M with history of DM, HTN, hyperlipidemia, CAD, hx of CVA x 2 with no residual deficit, hx of nephrolithiasis over 17 yo, current 1 ppd smoker here for acute onset of right flank pain with associated numbness. Patient had a CODE STROKE called by BRIANNE TOLBERT and had a CT Brain that was negative for acute CVA. Neurology evaluated the patient. Patient states he was just sitting on the couch when the right flank pain begain. Denies fevers, chills, nausea, vomiting, No chest pain or sob. + cough.

## 2019-09-29 NOTE — ED PROVIDER NOTE - PATIENT PORTAL LINK FT
You can access the FollowMyHealth Patient Portal offered by [facility] by registering at the following website: [email or web address]. By joining [facility]’s FollowMyHealth portal, you will also be able to view your health information using other applications (apps) compatible with our system.

## 2019-09-29 NOTE — ED ADULT NURSE NOTE - OBJECTIVE STATEMENT
Cachorro RN:  Pt A&OX4, NAD. Cachorro RN:  Pt A&OX4, NAD.  c/o R sided numbness that started approx 1 hour prior to arrival.  Pt also c/o R  flank pain.  Pt with hx of CVA approx 8 months ago, states symptoms were exactly the same as they are now. Pt received TPA at that time. No facial droop noted, no slurred speech noted.  Pt states is supposed to be on aspirin but does not take because "I don't want to take it."  Pt endorses smoking approx 1 pack of cigarettes a day.  CT complete, labs sent.  Report given to primary and facilitator RN.  EKG in progress.

## 2019-09-29 NOTE — ED PROVIDER NOTE - NSFOLLOWUPINSTRUCTIONS_ED_ALL_ED_FT
FOLLOW UP WITH YOUR PRIMARY CARE DOCTOR FOR CONTINUED EVALUATION OF YOUR FLANK/BACK PAIN    RETURN TO THE EMERGENCY ROOM IF YOU DEVELOP NEW OR WORSENING SYMPTOMS

## 2019-09-29 NOTE — ED PROVIDER NOTE - CLINICAL SUMMARY MEDICAL DECISION MAKING FREE TEXT BOX
acute onset right flank pain, pt also c/o numbness - had a CODE STROKE and neg CT Head. No deficit on exam. Right flank pain concerning for nephrolithiasis. Plan for pain control, non-contrast CT.

## 2019-09-29 NOTE — ED ADULT NURSE NOTE - CHIEF COMPLAINT QUOTE
C/o right flank pain that began 1.5 hours ago with urinary hesitancy. Also states about an hour ago he developed right sided weakness and numbness, states this feels similar to previous stroke. Also c/o right arm pain. Stroke eval by  H/o CVA 8-9 months ago, HTN, DM, HLD, CAD. .

## 2019-09-29 NOTE — CONSULT NOTE ADULT - ASSESSMENT
INCOMPLETE  Assessment:  49y R-handed Black M with PMH HTN, hypercholesterolemia, and self-reported stroke 8 months ago causing right arm and leg numbness and pain limited motion now presenting for the same issue unlikely to be due to stroke but rather due to pain limited weakness of musculoskeletal nature in the right hip. CT head negative for any acute infarcts or evidence of old infarcts.  PT is supposed to be on aspirin but is non-compliant and should re-start this.    Plan  -No need for further imaging inpatient.   -Can F/U outpatient if symptoms persist with neurology at [address] [place] [address].   -PT/OT for right hip pain.    -Ortho consul or quick follow up outpatient for recurrent right hip pain.     LKW: 21:20 09/29/19  NIHSS: 2   MRS: 0  Not a tPA candidate or thrombectomy candidate due to low NIHSS, unlikely symptoms due to a stroke and recurrence of exact same prior symptoms.   -[facility]H 09/29/19 IMPRESSION: No CT evidence of acute intracranial hemorrhage, mass effect or acute territorial infarct.   -Aspirin 81 mg daily  -Atorvastatin 80 mg daily (long-term goal LDL < 70)  -Tight glucose control (long-term goal HgbA1c < 6%)    Garrett Spencer, DO  PGY-2 Neurology Service Assessment:  49y R-handed Black M with PMH HTN, hypercholesterolemia, and self-reported stroke 8 months ago causing right arm and leg numbness and pain limited motion now presenting for the same issue unlikely to be due to stroke but rather due to pain limited weakness of musculoskeletal nature in the right hip. CT head negative for any acute infarcts or evidence of old infarcts.  PT is supposed to be on aspirin but is non-compliant and should re-start this.    LKW: 21:20 09/29/19  NIHSS: 2   MRS: 0  Not a tPA candidate or thrombectomy candidate due to low NIHSS, unlikely symptoms due to a stroke and recurrence of exact same prior symptoms.   -CTH 09/29/19 IMPRESSION: No CT evidence of acute intracranial hemorrhage, mass effect or acute territorial infarct.     Plan  -No need for further imaging inpatient.   -Can F/U outpatient if symptoms persist with neurology at [address] [facility]. with general neurology   -PT/OT for right hip pain.    -Ortho consul or quick follow up outpatient for recurrent right hip pain.   -Aspirin 81 mg daily  -Atorvastatin 80 mg daily (long-term goal LDL < 70)  -Tight glucose control (long-term goal HgbA1c < 6%)    Garrett Spencer, DO  PGY-2 Neurology Service

## 2019-09-30 LAB
APPEARANCE UR: CLEAR — SIGNIFICANT CHANGE UP
BACTERIA # UR AUTO: NEGATIVE — SIGNIFICANT CHANGE UP
BILIRUB UR-MCNC: SIGNIFICANT CHANGE UP
BLD GP AB SCN SERPL QL: NEGATIVE — SIGNIFICANT CHANGE UP
BLOOD UR QL VISUAL: NEGATIVE — SIGNIFICANT CHANGE UP
COLOR SPEC: YELLOW — SIGNIFICANT CHANGE UP
GLUCOSE UR-MCNC: NEGATIVE — SIGNIFICANT CHANGE UP
HYALINE CASTS # UR AUTO: NEGATIVE — SIGNIFICANT CHANGE UP
KETONES UR-MCNC: SIGNIFICANT CHANGE UP
LEUKOCYTE ESTERASE UR-ACNC: SIGNIFICANT CHANGE UP
NITRITE UR-MCNC: NEGATIVE — SIGNIFICANT CHANGE UP
PH UR: 5.5 — SIGNIFICANT CHANGE UP (ref 5–8)
PROT UR-MCNC: 20 — SIGNIFICANT CHANGE UP
RBC CASTS # UR COMP ASSIST: SIGNIFICANT CHANGE UP (ref 0–?)
RH IG SCN BLD-IMP: NEGATIVE — SIGNIFICANT CHANGE UP
SP GR SPEC: 1.03 — SIGNIFICANT CHANGE UP (ref 1–1.04)
SQUAMOUS # UR AUTO: SIGNIFICANT CHANGE UP
TRANS CELLS #/AREA URNS HPF: SIGNIFICANT CHANGE UP
UROBILINOGEN FLD QL: HIGH
WBC UR QL: SIGNIFICANT CHANGE UP (ref 0–?)

## 2019-10-01 ENCOUNTER — OUTPATIENT (OUTPATIENT)
Dept: OUTPATIENT SERVICES | Facility: HOSPITAL | Age: 49
LOS: 1 days | End: 2019-10-01
Payer: MEDICAID

## 2019-10-01 PROCEDURE — G9001: CPT

## 2019-10-02 ENCOUNTER — APPOINTMENT (OUTPATIENT)
Dept: GASTROENTEROLOGY | Facility: HOSPITAL | Age: 49
End: 2019-10-02

## 2019-10-02 ENCOUNTER — RESULT REVIEW (OUTPATIENT)
Age: 49
End: 2019-10-02

## 2019-10-02 ENCOUNTER — OUTPATIENT (OUTPATIENT)
Dept: OUTPATIENT SERVICES | Facility: HOSPITAL | Age: 49
LOS: 1 days | Discharge: HOME | End: 2019-10-02
Payer: MEDICAID

## 2019-10-02 DIAGNOSIS — R10.13 EPIGASTRIC PAIN: ICD-10-CM

## 2019-10-02 DIAGNOSIS — K25.3 ACUTE GASTRIC ULCER W/OUT HEMORRHAGE OR PERFORATION: ICD-10-CM

## 2019-10-02 PROCEDURE — 88312 SPECIAL STAINS GROUP 1: CPT | Mod: 26

## 2019-10-02 PROCEDURE — 88305 TISSUE EXAM BY PATHOLOGIST: CPT | Mod: 26

## 2019-10-02 PROCEDURE — 43239 EGD BIOPSY SINGLE/MULTIPLE: CPT | Mod: GC

## 2019-10-02 RX ORDER — SODIUM CHLORIDE 9 MG/ML
1000 INJECTION, SOLUTION INTRAVENOUS
Refills: 0 | Status: DISCONTINUED | OUTPATIENT
Start: 2019-10-02 | End: 2019-10-22

## 2019-10-02 RX ADMIN — SODIUM CHLORIDE 30 MILLILITER(S): 9 INJECTION, SOLUTION INTRAVENOUS at 10:50

## 2019-10-14 DIAGNOSIS — Z71.89 OTHER SPECIFIED COUNSELING: ICD-10-CM

## 2019-11-12 ENCOUNTER — EMERGENCY (EMERGENCY)
Facility: HOSPITAL | Age: 49
LOS: 1 days | Discharge: HOME | End: 2019-11-12
Attending: EMERGENCY MEDICINE | Admitting: EMERGENCY MEDICINE
Payer: MEDICAID

## 2019-11-12 VITALS
TEMPERATURE: 99 F | HEART RATE: 95 BPM | OXYGEN SATURATION: 96 % | RESPIRATION RATE: 18 BRPM | DIASTOLIC BLOOD PRESSURE: 77 MMHG | SYSTOLIC BLOOD PRESSURE: 116 MMHG

## 2019-11-12 PROCEDURE — 99283 EMERGENCY DEPT VISIT LOW MDM: CPT

## 2019-11-12 NOTE — ED PROVIDER NOTE - CPE EDP NEURO NORM
RECORDS RECEIVED FROM: Coccygectomy, Dr. Zuniga patient, Appt per Honey MALLOY Appt rescheduled per Pt   DATE RECEIVED: 02/18/19    NOTES STATUS DETAILS   OFFICE NOTE from referring provider Internal 8/23/18 Dr. Zuniga   OFFICE NOTE from other specialist Received 10/19/17 [facility]    DISCHARGE SUMMARY from hospital N/A    DISCHARGE REPORT from the ER N/A    OPERATIVE REPORT N/A    MEDICATION LIST Internal    IMPLANT RECORD/STICKER N/A    LABS     CBC/DIFF N/A    CULTURES N/A    INJECTIONS DONE IN RADIOLOGY N/A    MRI Received 11/10/17   CT SCAN N/A    XRAYS (IMAGES & REPORTS) Received 8/23/18, 10/19/17, 7/16/15, 5/28/15   TUMOR     PATHOLOGY  Slides & report N/A         normal...

## 2019-11-12 NOTE — ED PROVIDER NOTE - CLINICAL SUMMARY MEDICAL DECISION MAKING FREE TEXT BOX
pt refusing to stay; is asymptomatic with normal vitals; requesting uro referral list; has f/u with PCP tomorrow; symptoms have been present for 8 months making UTI/prostatitis unlikely; have informed pt may be prostate and to return If develops fever, worsening pain, hematuria

## 2019-11-12 NOTE — ED PROVIDER NOTE - NSFOLLOWUPINSTRUCTIONS_ED_ALL_ED_FT
I have attached the urology referral list  follow up with your primary care doctor tomorrow as scheduled  You can return to the ED at any point and you should if you develop high grade fevers, blood in urine, persistent vomiting or any condition in which you feel you require immediate attention.

## 2019-11-12 NOTE — ED PROVIDER NOTE - OBJECTIVE STATEMENT
50 yo M with history of DM, HTN, hyperlipidemia, CAD, hx of CVA x 2 with no residual deficit, hx of nephrolithiasis over 19 yo, presents for 3-4th visit for left flank pain.  States pain now resolved.  One episode of NB/NB emesis.  has followed up with urology and GI and states both physicians told him not an issue in their respective fields.  Denies hematuria.  Had CT in march of abdomen which showed possible cystitis but no evidence of nephrolithiasis.  States feels at baseline and has f/u with PCP tomorrow.  Does not want to stay for any reason.  Denies blood work, repeat CT.

## 2019-11-12 NOTE — ED PROVIDER NOTE - PATIENT PORTAL LINK FT
You can access the FollowMyHealth Patient Portal offered by [facility] by registering at the following website: [email or web address]. By joining [employer]’s FollowMyHealth portal, you will also be able to view your health information using other applications (apps) compatible with our system.

## 2019-11-12 NOTE — ED ADULT TRIAGE NOTE - PAIN RATING/NUMBER SCALE (0-10): ACTIVITY
Referral sent to [facility] to set up [facility] transport  Pt signed waiver and SW submitted qualified tool and placed copy of waiver on chart  Pt is in agreement  SW will continue to follow up  10

## 2019-11-14 ENCOUNTER — EMERGENCY (EMERGENCY)
Facility: HOSPITAL | Age: 49
LOS: 1 days | Discharge: HOME | End: 2019-11-14
Attending: STUDENT IN AN ORGANIZED HEALTH CARE EDUCATION/TRAINING PROGRAM
Payer: MEDICAID

## 2019-11-14 VITALS
DIASTOLIC BLOOD PRESSURE: 80 MMHG | OXYGEN SATURATION: 95 % | SYSTOLIC BLOOD PRESSURE: 125 MMHG | RESPIRATION RATE: 18 BRPM | HEART RATE: 102 BPM | TEMPERATURE: 98 F

## 2019-11-14 PROCEDURE — 99282 EMERGENCY DEPT VISIT SF MDM: CPT

## 2019-11-14 NOTE — ED PROVIDER NOTE - CLINICAL SUMMARY MEDICAL DECISION MAKING FREE TEXT BOX
Patient presenting with dysuria however requesting not to have an evaluation or w/u, requesting d/c paperwork.

## 2019-11-14 NOTE — ED ADULT TRIAGE NOTE - CHIEF COMPLAINT QUOTE
states" I am having urinary urgency with blood n the urine and has pain in both sides since few days and is getting worst "

## 2019-11-14 NOTE — ED PROVIDER NOTE - PATIENT PORTAL LINK FT
You can access the FollowMyHealth Patient Portal offered by [facility] by registering at the following website: [email or web address]. By joining [employer]’s FollowMyHealth portal, you will also be able to view your health information using other applications (apps) compatible with our system.

## 2019-11-14 NOTE — ED PROVIDER NOTE - OBJECTIVE STATEMENT
The patient is a 48y/o M p/w c/o dysuria however on initial evaluation the patient requesting discharge, refusing a formal evaluation, stating "I would just The patient is a 48y/o M p/w c/o dysuria however on initial evaluation the patient requesting discharge, refusing a formal evaluation, stating "I would just like to go home."

## 2019-11-18 ENCOUNTER — EMERGENCY (EMERGENCY)
Facility: HOSPITAL | Age: 49
LOS: 1 days | Discharge: LEFT BEFORE DISCHARGE | End: 2019-11-18
Admitting: EMERGENCY MEDICINE

## 2019-11-18 VITALS
RESPIRATION RATE: 17 BRPM | SYSTOLIC BLOOD PRESSURE: 116 MMHG | DIASTOLIC BLOOD PRESSURE: 76 MMHG | OXYGEN SATURATION: 97 % | TEMPERATURE: 98 F | HEART RATE: 92 BPM

## 2019-11-18 NOTE — ED ADULT TRIAGE NOTE - CHIEF COMPLAINT QUOTE
Patient c/o LLQ pain, pain on urination and pain to left flank when urinating xfew months. Patient reports he was seen by MD several times with no relief. Patient c/o blood in urine and "night sweats", denies any blood thinners. Hx. HTN, HLD, pre-diabetes, legally deaf.

## 2019-11-18 NOTE — ED ADULT NURSE NOTE - EXPLANATION OF PATIENT'S REASON FOR LEAVING
"I want to be discharged, im going to a different hospital". Patient demanded to be discharged, yelling at staff; Patient states "im going to a different hospital". Patient ambulated out of ED with significant other.

## 2019-11-24 ENCOUNTER — EMERGENCY (EMERGENCY)
Facility: HOSPITAL | Age: 49
LOS: 1 days | Discharge: LEFT BEFORE DISCHARGE | End: 2019-11-24
Admitting: EMERGENCY MEDICINE

## 2019-11-24 VITALS
RESPIRATION RATE: 16 BRPM | DIASTOLIC BLOOD PRESSURE: 96 MMHG | OXYGEN SATURATION: 100 % | HEART RATE: 86 BPM | TEMPERATURE: 98 F | SYSTOLIC BLOOD PRESSURE: 133 MMHG

## 2019-11-24 NOTE — ED ADULT TRIAGE NOTE - CHIEF COMPLAINT QUOTE
Pt states that he has been having hematuria and urgency to defecate for months, states that it has been severe for the past 2 weeks.  PMH HTN CVA, peptic ulcers has been non compliant with medications for over a year.

## 2019-11-24 NOTE — ED ADULT NURSE NOTE - OBJECTIVE STATEMENT
Patient was in the process of been seen I offered him the cup for urine first, then he walked out to park his car, I advised him that he needs to change in a gown before he will be seen and stay in his room, he started cursing and he eloped without been seen.

## 2019-11-24 NOTE — ED ADULT TRIAGE NOTE - CADM TRG TX PRIOR TO ARRIVAL
-At risk for malabsorption of vitamins/minerals secondary to malabsorption and restriction of intake from their procedure  -Currently taking adequate postop bariatric surgery vitamin supplementation-no  -Last set of bariatric labs completed on 4/17 and are not within acceptable limits   -Next set of bariatric labs ordered for approximately 2 weeks     She has been only taking one regular multivitamin supplement daily-advised on importance of adequate supplements to avoid deficiency and long-term associated medical problems    Recommended that she try one [employer] multivitamin/mineral supplement with 18 mg of iron and 500 mg calcium citrate with D three times per day none

## 2019-12-10 ENCOUNTER — APPOINTMENT (OUTPATIENT)
Dept: UROLOGY | Facility: CLINIC | Age: 49
End: 2019-12-10

## 2020-01-27 ENCOUNTER — APPOINTMENT (OUTPATIENT)
Dept: UROLOGY | Facility: CLINIC | Age: 50
End: 2020-01-27

## 2020-02-12 ENCOUNTER — APPOINTMENT (OUTPATIENT)
Dept: UROLOGY | Facility: CLINIC | Age: 50
End: 2020-02-12
Payer: MEDICAID

## 2020-02-12 VITALS
WEIGHT: 166 LBS | HEIGHT: 67 IN | HEART RATE: 94 BPM | TEMPERATURE: 98.7 F | BODY MASS INDEX: 26.06 KG/M2 | SYSTOLIC BLOOD PRESSURE: 95 MMHG | DIASTOLIC BLOOD PRESSURE: 63 MMHG

## 2020-02-12 DIAGNOSIS — R39.9 UNSPECIFIED SYMPTOMS AND SIGNS INVOLVING THE GENITOURINARY SYSTEM: ICD-10-CM

## 2020-02-12 DIAGNOSIS — J44.9 CHRONIC OBSTRUCTIVE PULMONARY DISEASE, UNSPECIFIED: ICD-10-CM

## 2020-02-12 DIAGNOSIS — Z86.79 PERSONAL HISTORY OF OTHER DISEASES OF THE CIRCULATORY SYSTEM: ICD-10-CM

## 2020-02-12 DIAGNOSIS — F17.210 NICOTINE DEPENDENCE, CIGARETTES, UNCOMPLICATED: ICD-10-CM

## 2020-02-12 DIAGNOSIS — Z86.73 PERSONAL HISTORY OF TRANSIENT ISCHEMIC ATTACK (TIA), AND CEREBRAL INFARCTION W/OUT RESIDUAL DEFICITS: ICD-10-CM

## 2020-02-12 DIAGNOSIS — R31.0 GROSS HEMATURIA: ICD-10-CM

## 2020-02-12 PROCEDURE — 99214 OFFICE O/P EST MOD 30 MIN: CPT

## 2020-02-12 RX ORDER — OMEPRAZOLE 20 MG/1
20 CAPSULE, DELAYED RELEASE ORAL TWICE DAILY
Qty: 60 | Refills: 3 | Status: DISCONTINUED | COMMUNITY
Start: 2019-10-02 | End: 2020-02-12

## 2020-02-12 NOTE — PHYSICAL EXAM
[General Appearance - Well Developed] : well developed [Normal Appearance] : normal appearance [General Appearance - Well Nourished] : well nourished [Edema] : no peripheral edema [Well Groomed] : well groomed [General Appearance - In No Acute Distress] : no acute distress [Respiration, Rhythm And Depth] : normal respiratory rhythm and effort [Exaggerated Use Of Accessory Muscles For Inspiration] : no accessory muscle use [Abdomen Tenderness] : non-tender [Costovertebral Angle Tenderness] : no ~M costovertebral angle tenderness [Abdomen Soft] : soft [Scrotum] : the scrotum was normal [Urethral Meatus] : meatus normal [Urinary Bladder Findings] : the bladder was normal on palpation [Testes Mass (___cm)] : there were no testicular masses [Normal Station and Gait] : the gait and station were normal for the patient's age [] : no rash [No Focal Deficits] : no focal deficits [Oriented To Time, Place, And Person] : oriented to person, place, and time [Affect] : the affect was normal [Mood] : the mood was normal [Not Anxious] : not anxious [No Palpable Adenopathy] : no palpable adenopathy [Refused] : exam was refused by the patient [No Lesions] : no lesions [Circumcised] : the penis was circumcised [Normal] : normal [Testes] : normal [Epididymis] : was normal [Vas Deferens / Spermatic Cord] : was normal [Adherent Prepuce] : no adherence of the prepuce [Swelling] : no swelling [Tenderness] : no tenderness [Phimosis] : no phimosis [Paraphimosis] : no paraphimosis [Discharge] : no discharge [Balanitis] : no balanitis [Priapism] : no priapism

## 2020-02-12 NOTE — ADDENDUM
[FreeTextEntry1] : I, Kimberly Innocent, acted solely as a scribe for Dr. Skyler Louie on this date, 2/12/2020.\par \par All medical record entries made by the Scribe were at my Dr. Skyler Louie direction and personally dictated by me on, 2/12/2020. I have reviewed the chart and agree that the record accurately reflects my personal performance of the history, physical exam, assessment and plan. I have also personally directed, reviewed and agreed with the chart.\par

## 2020-02-12 NOTE — REVIEW OF SYSTEMS
[Recent Weight Loss (___ Lbs)] : recent [unfilled] ~Ulb weight loss [Eyesight Problems] : eyesight problems [Feeling Tired] : feeling tired [Abdominal Pain] : abdominal pain [Shortness Of Breath] : shortness of breath [Diarrhea] : diarrhea [History of kidney stones] : history of kidney stones [Pain during urination] : pain during urination [Strong urge to urinate] : strong urge to urinate [Wake up at night to urinate  How many times?  ___] : wakes up to urinate [unfilled] times during the night [Strain or push to urinate] : strain or push to urinate [Wait a long time to urinate] : waits a long time to urinate [Slow urine stream] : slow urine stream [Interrupted urine stream] : interrupted urine stream [Anxiety] : anxiety [Depression] : depression [Negative] : Heme/Lymph

## 2020-02-12 NOTE — HISTORY OF PRESENT ILLNESS
[FreeTextEntry1] : 3/29/2019 seen by Dr. Hernandez \par Mr Persaud is a 48 yo male with hx heart disease and DM presents with abdominal pain and LUTS. The pain has been for the last 2 weeks. It is located in the epigastrium/left midabdomen and is worsened by urination and movement. It is associated with new-onset urinary frequency, nocturia and intermittency. When it started, he noticed blood in his urine which prompted him to go to the ED. Urine culture at that time was negative and CT abdomen showed a thickened bladder wall. No stones or other intraabdominal pathology was noted. His denies any further gross hematuria. One week later the pain continued and he went to the ED. He still did not have a urinary tract infection and CT findings were the same. \par \par Since 2 days ago, he started having nonbloody diarrhea and nausea. He has occasional chills but no fevers. \par \par Patient is currently experiencing nausea urinary frequency, nocturia, intermittency, dysuria and abdominal pain, but no urinary incontinence, no urinary retention, no urinary urgency, no straining, no gross hematuria, no microscopic hematuria and no fever. \par \par 4/29/2019 Cystoscopy performed.\par \par 2/12/2020: 49 y/o male presenting for follow-up for pain during urination. Pt c/o of pain during urination for 1 year. Nocturia x 2. Has dysuria. burning  PVR 21 cc.

## 2020-02-12 NOTE — ASSESSMENT
[FreeTextEntry1] : marked irritative symptoms and burning urination, h/o hematuria.\par Urine Analysis, culture, cytology. CMP\par Does not have medications with him nor remember the names. \par Will bring meds at the next appointment

## 2020-03-17 ENCOUNTER — APPOINTMENT (OUTPATIENT)
Dept: UROLOGY | Facility: CLINIC | Age: 50
End: 2020-03-17

## 2021-02-02 NOTE — ED PROVIDER NOTE - CPE EDP RESP NORM
"PT comes from home by EMS.     Grandmother called EMS bc patient was \"violently shaking.\" Pt was able to recall entire shaking event and was able to tell EMS everything that happened during the shaking event. Pt also stated that she was able to speak immediately following event and that they only called EMS 1 hr after event resolved itself bc her face was tingling.     Pt has a suprapubic catheter and is being treated for a UTI.     AOx4, , VS stable.   " normal...

## 2021-03-08 NOTE — ED ADULT NURSE NOTE - NS ED NURSE DC PT EDUCATION RESOURCES
Goal Outcome Evaluation:         Patient is stable. Pain is well controlled with oral and IV pain medication. Patient is reluctant to get out of bed, we have encourage her. Patient may possibly have surgery tomorrow. Will continue to monitor   Yes

## 2022-04-15 NOTE — ED PROVIDER NOTE - PROGRESS NOTE DETAILS
[facility] [employer] [facility]      PATIENT NAME: Faustino Fischer   : 1971    AGE: 51 y.o. DATE: 04/15/2022   MRN: [id number]        Reason for Visit / Chief Complaint: Medicare AWV (Welcome to medicare )        Faustino Fischer presents for a Welcome to Medicare AWV today.     The following components were reviewed and updated:    Medical/Social/Family History:  Past Medical History:   Diagnosis Date    Cardiomegaly     CHF (congestive heart failure)     Coronary artery disease     Diabetes mellitus     Hypertension     Irregular heart beat     Myocardial infarction     Pancreatitis     Renal failure         Family History   Problem Relation Age of Onset    Heart disease Father     Hypertension Father     Heart disease Brother     Heart disease Maternal Grandmother         Past Surgical History:   Procedure Laterality Date    CARDIAC SURGERY      Stents x2    UNDESCENDED TESTICLE EXPLORATION N/A        Social History     Tobacco Use   Smoking Status Current Some Day Smoker    Packs/day: 0.25    Years: 18.00    Pack years: 4.50    Types: Cigarettes    Start date:    Smokeless Tobacco Never Used       Social History     Substance and Sexual Activity   Alcohol Use Yes    Comment: occasional drink previous heavy drinker quit heavly in          · Allergies and Current Medications   Review of patient's allergies indicates:  No Known Allergies    Current Outpatient Medications:     atorvastatin (LIPITOR) 80 MG tablet, Take 1 tablet (80 mg total) by mouth once daily., Disp: 30 tablet, Rfl: 2    carvediloL (COREG) 12.5 MG tablet, Take 1 tablet (12.5 mg total) by mouth before breakfast., Disp: 30 tablet, Rfl: 2    fenofibrate (TRICOR) 145 MG tablet, Take 1 tablet (145 mg total) by mouth once daily., Disp: 30 tablet, Rfl: 2    furosemide (LASIX) 20 MG tablet, Take 1 tablet (20 mg total) by mouth once daily., Disp: 90 tablet, Rfl: 2    isosorbide mononitrate (IMDUR) 30 MG 24  hr tablet, Take 15 mg by mouth once daily., Disp: 30 tablet, Rfl: 2    losartan (COZAAR) 50 MG tablet, Take 1 tablet (50 mg total) by mouth once daily., Disp: 30 tablet, Rfl: 2    metFORMIN (GLUCOPHAGE-XR) 500 MG ER 24hr tablet, Take 1 tablet (500 mg total) by mouth daily with breakfast., Disp: 90 tablet, Rfl: 2    nitroGLYCERIN (NITROBID) 2.5 MG CpSR, Take 2.5 mg by mouth every 8 (eight) hours., Disp: , Rfl:     SUPER THIN LANCETS 28 gauge Misc, 2 (two) times daily., Disp: , Rfl:     TRUE METRIX GLUCOSE METER Misc, 2 (two) times daily., Disp: , Rfl:     pantoprazole (PROTONIX) 40 MG tablet, Take 1 tablet (40 mg total) by mouth once daily. (Patient not taking: Reported on 4/15/2022), Disp: 30 tablet, Rfl: 11      · Health Risk Assessment   Fall Risk: yes   Obesity: BMI Body mass index is 40.19 kg/m².   Advance Directive: Does not have an advanced directive. Declined information    Depression: PHQ9- 4   HTN:  DASH diet, exercise, weight management, med compliance, home BP monitoring, and follow-up discussed.   T2DM:  diabetic diet, glucose monitoring, activity level, weight management, med compliance, and follow-up discussed.  STI: not at risk   Statin Use: yes      · Health Maintenance   Last eye exam: pt states 2019 with dr. Gardner. Declines referral   Last CV screen with lipids: 03/17/22   Diabetes screening with fasting glucose or A1c: 03/17/2022   Colonoscopy: has not had will order cologuard due to transportation difficulty   Flu Vaccine: 03/17/2022   Pneumonia vaccines: n/a age 51   COVID vaccine: 0/26/2021, 02/19/21, 03/17/22   Hep B vaccine: na   DEXA: na  PSA:03/17/2022  AAA screening: na   HIV Screeing: not at risk  Hepatitis C Screen: available, would like to wait till next routine labs  Low Dose CT Scan: na  EKG: followed by cardiology has numerous ekg     Health Maintenance Topics with due status: Not Due       Topic Last Completion Date    Lipid Panel 03/17/2022    Hemoglobin A1c 03/17/2022     High Dose Statin 04/15/2022     Health Maintenance Due   Topic Date Due    Hepatitis C Screening  Never done    Diabetes Urine Screening  Never done    Pneumococcal Vaccines (Age 0-64) (1 - PCV) Never done    Foot Exam  Never done    Eye Exam  Never done    HIV Screening  Never done    TETANUS VACCINE  Never done    Colorectal Cancer Screening  Never done    Shingles Vaccine (1 of 2) Never done         Lab results available in Epic or see dates from [facility] above:   Lab Results   Component Value Date    CHOL 165 03/17/2022     Lab Results   Component Value Date    HDL 36 (L) 03/17/2022     Lab Results   Component Value Date    LDLCALC 101 03/17/2022     Lab Results   Component Value Date    TRIG 138 03/17/2022    TRIG 115 06/27/2021         Lab Results   Component Value Date    HGBA1C 6.4 03/17/2022       Sodium   Date Value Ref Range Status   03/17/2022 144 136 - 145 mmol/L Final     Potassium   Date Value Ref Range Status   03/17/2022 3.8 3.5 - 5.1 mmol/L Final     Chloride   Date Value Ref Range Status   03/17/2022 107 98 - 107 mmol/L Final     CO2   Date Value Ref Range Status   03/17/2022 28 21 - 32 mmol/L Final     Glucose   Date Value Ref Range Status   03/17/2022 124 (H) 74 - 106 mg/dL Final     BUN   Date Value Ref Range Status   03/17/2022 6 (L) 7 - 18 mg/dL Final     Creatinine   Date Value Ref Range Status   03/17/2022 0.98 0.70 - 1.30 mg/dL Final     Calcium   Date Value Ref Range Status   03/17/2022 8.9 8.5 - 10.1 mg/dL Final     Total Protein   Date Value Ref Range Status   03/17/2022 7.2 6.4 - 8.2 g/dL Final     Albumin   Date Value Ref Range Status   03/17/2022 3.1 (L) 3.5 - 5.0 g/dL Final     Bilirubin, Total   Date Value Ref Range Status   03/17/2022 0.2 0.0 - 1.2 mg/dL Final     Alk Phos   Date Value Ref Range Status   03/17/2022 68 45 - 115 U/L Final     AST   Date Value Ref Range Status   03/17/2022 14 (L) 15 - 37 U/L Final     ALT   Date Value Ref Range Status   03/17/2022 17 16 - 61 U/L  "Final     Anion Gap   Date Value Ref Range Status   03/17/2022 13 7 - 16 mmol/L Final     eGFR    Date Value Ref Range Status   06/28/2021 108 >=60 mL/min/1.73m² Final     eGFR   Date Value Ref Range Status   03/17/2022 85 >=60 mL/min/1.73m² Final         Lab Results   Component Value Date    PSA 0.404 03/17/2022          Incontinence  Bowel: no  Bladder: no      · Care Team  Dr. Ryan - PCP                             Dr. Peña - Cardiologist                             Dr. Gardner - Optometrist                             Dr. Mcintosh - Internal medicine          **See Completed Assessments for Annual Wellness visit within the encounter summary    The following assessments were completed & reviewed:  · Depression Screening  · Cognitive function Screening  · Timed Get Up Test  · Whisper Test  · Vision Screen  · Health Risk Assessment  · Checklist of ADLs and IADLs        Objective  Vitals:    04/15/22 1117 04/15/22 1150   BP: (!) 139/97 (!) 136/92   Pulse: 87    Resp: 16    Temp: 97 °F (36.1 °C)    SpO2: 96%    Weight: 112.9 kg (249 lb)    Height: 5' 6" (1.676 m)    PainSc:   5       Body mass index is 40.19 kg/m².  Ideal body weight: 63.8 kg (140 lb 10.5 oz)       Physical Exam  Vitals reviewed.   Constitutional:       Appearance: Normal appearance.   HENT:      Head: Normocephalic and atraumatic.   Eyes:      Extraocular Movements: Extraocular movements intact.      Pupils: Pupils are equal, round, and reactive to light.   Cardiovascular:      Rate and Rhythm: Normal rate and regular rhythm.   Pulmonary:      Effort: Pulmonary effort is normal.      Breath sounds: Normal breath sounds.   Musculoskeletal:         General: Normal range of motion.      Cervical back: Normal range of motion and neck supple.   Skin:     General: Skin is warm and dry.   Neurological:      General: No focal deficit present.      Mental Status: He is alert and oriented to person, place, and time. "           Assessment:     1. Primary hypertension    2. Type 2 diabetes mellitus without complication, without long-term current use of insulin  - Microalbumin/Creatinine Ratio, Urine    3. Chronic systolic congestive heart failure    4. Mixed hyperlipidemia    5. Screening for malignant neoplasm of colon  - Cologuard Screening (Multitarget Stool DNA); Future  - Cologuard Screening (Multitarget Stool DNA)    6. Screening for viral disease    7. BMI 40.0-44.9, adult    8. Morbid obesity due to excess calories         Plan:    Referrals:   CR cancer screening     Advised to call office if does not hear from anyone with referral appt within 2-3 weeks to check on status of referral. Voiced understanding.      Discussed and provided with a screening schedule and personal prevention plan in accordance with USPSTF age appropriate recommendations and Medicare screening guidelines.   Education, counseling, and referrals were provided as needed.  After Visit Summary printed and given to patient which includes written education and a list of any referrals if indicated.     Education including diet, exercise, falls and advanced directives discussed with patient and patient verbalized understanding.      Assistance with smoking cessation was offered, including:  []  Medications  []  Counseling  [x]  Printed Information on Smoking Cessation  []  Referral to a Smoking Cessation Program    Patient was counseled regarding smoking for 3-10 minutes.      F/u plan for yearly AWV.    Signature: Dr. Juan Pablo Ryan MD       Delmis, pgy3: pt cleared by neurology, no additional w/u for stroke indicated. Pt refusing to stay for CT stone hunt, kidney function WNL, pt stable for dc Patient demonstrates understanding that work up is incomplete with regards to his flank pain. He exhibits a clear understanding of recommendations but still wants to be discharged.

## 2023-06-01 NOTE — CONSULT NOTE ADULT - SUBJECTIVE AND OBJECTIVE BOX
Dad; Brent called about Lauri's prescription of the Methylphenidate 30mg    He requested the prescription to be sent to the [facility] and it appears that [facility] [place].  I notified dad and he said he will call the [facility] in [place] to see if they have it available and he will let us know.  He requested the [facility] in [place] since he was told they have it.    NEUROLOGY CONSULT   HPI: Mr. Ok Persaud is a 49y R-handed Black man with a PMH of HTN, hypercholesterolemia and self-reported stroke 8 months ago with right arm and leg numbness and weakness (resolved) who presented on 09-29-19 as a code stroke with same symptoms LKW 21:20 09/29/19.  PT reports sudden onset numbness in the right hand and pain limited weakness of the right leg which are identical in nature to prior presentation.  He was supposed to be on asa 81 mg but notes he is not taking it.  He describes a sharp shooting pain down right leg.  He additionally reports the left arm also started having some paresthesias.        ROS: A 10-system ROS was performed and is negative except for those items noted above.     PMH:  Pre-diabetes  High cholesterol  HTN (hypertension)    Home Medications: ASA 81 mg (non-compliant)    ALLERGIES: No Known Allergies    PSH: No significant past surgical history    Social History: 20 pack year smoking history, active now.  Social drinker, denies any drugs.     FH: HTN    OBJECTIVE  Vital Signs Last 24 Hrs  T(C): 36.8 (29 Sep 2019 22:32), Max: 36.8 (29 Sep 2019 22:32)  T(F): 98.3 (29 Sep 2019 22:32), Max: 98.3 (29 Sep 2019 22:32)  HR: 64 (29 Sep 2019 22:32) (64 - 64)  BP: 121/87 (29 Sep 2019 22:32) (121/87 - 121/87)  BP(mean): --  RR: 16 (29 Sep 2019 22:32) (16 - 16)  SpO2: 100% (29 Sep 2019 22:32) (100% - 100%)  I&O's Summary    PHYSICAL EXAM:  General: Average built male appears stated age, in NAD smells of smoke.   Neurologic:  Mental Status: Awake, alert, oriented to person, place, situation, time. Normal affect. Follows all commands.  Language: Speech is clear, fluent with preserved naming, repetition and comprehension.    Cranial Nerves: PERRLA (R = 3mm, L = 3mm). Visual fields intact. EOMI, left horizontal nystagmus. V1-3 intact to light touch.  No facial asymmetry b/l, full eye closure strength b/l. Hearing grossly normal to conversation.  Symmetric palate elevation in midline.  Head turning & shoulder shrug intact b/l. Tongue midline, normal movements, no atrophy.  Motor: Normal muscle bulk & tone. No noticeable tremor, myoclonus or pronator drift. 5/5 strength on individual testing.  Pain limited ROM in hip flexion of right hip.	  Sensation: Symmetric B/L preserved sensation to light touch, pin prick, position other than minimal reduced light touch sensation of right hand vs left hand.    Cortical: No extinction on double simultaneous touch and no signs of neglect.   Reflexes: 2/4 throughout.  Plantar Responses are downgoing B/L.   Coordination: Intact rapid-alternating movements. No dysmetria on finger-to-nose or heel-to-shin.    Gait: Stooped posture to right, reduced stride length, arm swing and loida with antalgic gait of right leg with resultant minimal postural instability.   Psychiatric: Normal mood and congruent affect.     CBC:                        14.2   9.67  )-----------( 234      ( 29 Sep 2019 23:00 )             40.5       CMP:  09-29    141  |  102  |  12  ----------------------------<  126<H>  4.2   |  26  |  0.86    Ca    9.3      29 Sep 2019 23:00    TPro  7.1  /  Alb  4.1  /  TBili  0.5  /  DBili  x   /  AST  18  /  ALT  6   /  AlkPhos  62  09-29  PT/INR - ( 29 Sep 2019 23:00 )   PT: 9.9 SEC;   INR: 0.87         < from: CT Brain Stroke Protocol (09.29.19 @ 22:49) >  IMPRESSION:   No CT evidence of acute intracranial hemorrhage, mass effect or acute   territorial infarct.     < end of copied text >
